# Patient Record
Sex: FEMALE | Race: WHITE | NOT HISPANIC OR LATINO | Employment: FULL TIME | URBAN - METROPOLITAN AREA
[De-identification: names, ages, dates, MRNs, and addresses within clinical notes are randomized per-mention and may not be internally consistent; named-entity substitution may affect disease eponyms.]

---

## 2019-01-25 ENCOUNTER — HOSPITAL ENCOUNTER (EMERGENCY)
Facility: HOSPITAL | Age: 49
Discharge: HOME | End: 2019-01-25
Attending: EMERGENCY MEDICINE | Admitting: EMERGENCY MEDICINE

## 2019-01-25 VITALS
DIASTOLIC BLOOD PRESSURE: 109 MMHG | HEIGHT: 66 IN | RESPIRATION RATE: 16 BRPM | TEMPERATURE: 97.9 F | SYSTOLIC BLOOD PRESSURE: 152 MMHG | HEART RATE: 82 BPM | OXYGEN SATURATION: 100 %

## 2019-01-25 DIAGNOSIS — M54.12 CERVICAL RADICULOPATHY: Primary | ICD-10-CM

## 2019-01-25 PROCEDURE — 99283 EMERGENCY DEPT VISIT LOW MDM: CPT

## 2019-01-25 RX ORDER — METHYLPREDNISOLONE 4 MG/1
TABLET ORAL
Qty: 1 EACH | Refills: 0 | OUTPATIENT
Start: 2019-01-25 | End: 2020-01-20

## 2019-01-25 RX ORDER — METAXALONE 800 MG/1
800 TABLET ORAL 3 TIMES DAILY PRN
Qty: 30 TABLET | Refills: 0 | Status: SHIPPED | OUTPATIENT
Start: 2019-01-25 | End: 2022-12-05

## 2019-01-25 RX ORDER — TRAMADOL HYDROCHLORIDE 50 MG/1
50 TABLET ORAL EVERY 6 HOURS PRN
Qty: 20 TABLET | Refills: 0 | OUTPATIENT
Start: 2019-01-25 | End: 2020-01-20

## 2019-01-25 NOTE — ED NOTES
"+neck pain to right side of neck for about a month. Has noticed two \"knots\" to mid back of neck and right side of neck that pt reports have gotten bigger and become more painful.     Pt is a dental assistant.    Was seen at urgent care earlier this month and was told she may have to have an MRI         Nitza Vanessa, RN  01/25/19 5842    "

## 2019-01-25 NOTE — ED PROVIDER NOTES
" EMERGENCY DEPARTMENT ENCOUNTER    CHIEF COMPLAINT  Chief Complaint: neck pain  History given by: patient  History limited by: none  Room Number: 11/11  PMD: Provider, No Known      HPI:  Pt is a 48 y.o. female who presents complaining of generalized neck pain that began a couple months ago that is worse with turning her head. 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 Pt states she was seen to [facility] at that time and prescribed baclofen. Pt states she has been taking Advil \"religiously.\" Pt states pain radiates down her BUE. Pt states \"its weird and that I have pressure.\" Pt denies dropping items, tingling, and numbness. Pt states she is not followed by a PCP at this time due to recent move.         Duration:  Couple of months  Onset: gradual  Timing: constant  Location: neck  Radiation: BUE  Quality: pain, \"visible knot\"   Intensity/Severity: moderate  Progression: more localized   Aggravating Factors: movement  Treatment before arrival: Pt has been taking Advil for pain relief.     PAST MEDICAL HISTORY  Active Ambulatory Problems     Diagnosis Date Noted   • No Active Ambulatory Problems     Resolved Ambulatory Problems     Diagnosis Date Noted   • No Resolved Ambulatory Problems     Past Medical History:   Diagnosis Date   • Fibromyalgia        PAST SURGICAL HISTORY  Past Surgical History:   Procedure Laterality Date   • ANKLE SURGERY Left 2009       FAMILY HISTORY  History reviewed. No pertinent family history.    SOCIAL HISTORY  Social History     Socioeconomic History   • Marital status:      Spouse name: Not on file   • Number of children: Not on file   • Years of education: Not on file   • Highest education level: Not on file   Social Needs   • Financial resource strain: Not on file   • Food insecurity - worry: Not on file   • Food insecurity - inability: Not on file   • " Transportation needs - medical: Not on file   • Transportation needs - non-medical: Not on file   Occupational History   • Not on file   Tobacco Use   • Smoking status: Never Smoker   • Smokeless tobacco: Never Used   Substance and Sexual Activity   • Alcohol use: Not on file   • Drug use: Not on file   • Sexual activity: Not on file   Other Topics Concern   • Not on file   Social History Narrative   • Not on file       ALLERGIES  Codeine    REVIEW OF SYSTEMS  Review of Systems   Constitutional: Negative for fever.   HENT: Negative for sore throat.    Eyes: Negative.    Respiratory: Negative for cough and shortness of breath.    Cardiovascular: Negative for chest pain.   Gastrointestinal: Negative for abdominal pain, diarrhea and vomiting.   Genitourinary: Negative for dysuria.   Musculoskeletal: Positive for neck pain (Radiates to BUE). Negative for back pain.   Skin: Negative for rash.   Neurological: Negative for weakness, numbness and headaches.   Hematological: Negative.    Psychiatric/Behavioral: Negative.    All other systems reviewed and are negative.      PHYSICAL EXAM  ED Triage Vitals [01/25/19 1448]   Temp Heart Rate Resp BP SpO2   97.9 °F (36.6 °C) 90 18 -- 100 %      Temp src Heart Rate Source Patient Position BP Location FiO2 (%)   Tympanic -- -- -- --       Physical Exam   Constitutional: She is oriented to person, place, and time. No distress.   HENT:   Head: Normocephalic and atraumatic.   Eyes: EOM are normal. Pupils are equal, round, and reactive to light.   Neck: Normal range of motion. Neck supple.   Right paracervical tenderness with a palpable muscle spasm    Abdominal: Soft. There is no tenderness. There is no rebound and no guarding.   Musculoskeletal: Normal range of motion. She exhibits no edema.   Neurological: She is alert and oriented to person, place, and time. She has normal sensation, normal strength, normal reflexes and intact cranial nerves. She displays normal reflexes.  Coordination normal.   Pt has normal ROM and sensation in extremities x4 (Wrist, hand, shoulder abduction and shrug, DTR normal). Pt is NVI.        Skin: Skin is warm and dry. No rash noted.   Psychiatric: Mood and affect normal.   Nursing note and vitals reviewed.        PROGRESS AND CONSULTS     1750-Pt is resting comfortably. Notified pt that based of her history and exam findings that neck pain is consistent with cervical radiculopathy. Discussed the plan to discharge the pt home with prescriptions for ultram, skelaxin, and medrol pack. I instructed the pt to f/u with [facility] and neurosurgery for f/u. Pt understands and agrees with the plan, all questions answered.        MEDICAL DECISION MAKING  Results were reviewed/discussed with the patient and they were also made aware of online access. Pt also made aware that some labs, such as cultures, will not be resulted during ER visit and follow up with PMD is necessary.     MDM       DIAGNOSIS  Final diagnoses:   Cervical radiculopathy       DISPOSITION  DISCHARGE    Patient discharged in stable condition.    Reviewed implications of results, diagnosis, meds, responsibility to follow up, warning signs and symptoms of possible worsening, potential complications and reasons to return to ER.    Patient/Family voiced understanding of above instructions.    Discussed plan for discharge, as there is no emergent indication for admission. Patient referred to primary care provider for BP management due to today's BP. Pt/family is agreeable and understands need for follow up and repeat testing.  Pt is aware that discharge does not mean that nothing is wrong but it indicates no emergency is present that requires admission and they must continue care with follow-up as given below or physician of their choice.     FOLLOW-UP  PATIENT LIAISON [facility] [id number] [phone]  Schedule an appointment as soon as possible for a visit       Hitesh  MD Maciel  [address]  [facility] [address]  [facility] [address]  [phone]    Call       [facility] Emergency Department  [address]  [facility] [address] [phone]    As needed, If symptoms worsen         Medication List      New Prescriptions    metaxalone 800 MG tablet  Commonly known as:  SKELAXIN  Take 1 tablet by mouth 3 (Three) Times a Day As Needed for Muscle Spasms.     MethylPREDNISolone 4 MG tablet  Commonly known as:  MEDROL (JANAY)  Take as directed on package instructions.        Changed    traMADol 50 MG tablet  Commonly known as:  ULTRAM  Take 1 tablet by mouth Every 6 (Six) Hours As Needed for Moderate Pain .  What changed:    how much to take  how to take this  when to take this  reasons to take this        Stop    baclofen 10 MG tablet  Commonly known as:  LIORESAL              Latest Documented Vital Signs:  As of 6:06 PM  BP- (!) 152/109 HR- 90 Temp- 97.9 °F (36.6 °C) (Tympanic) O2 sat- 100%    --  Documentation assistance provided by austin Encarnacion for MD Blair.  Information recorded by the scribe was done at my direction and has been verified and validated by me.     Lisseth Encarnacion  01/25/19 4513       Santino Hernandez MD  01/25/19 8163

## 2020-01-20 ENCOUNTER — APPOINTMENT (OUTPATIENT)
Dept: ULTRASOUND IMAGING | Facility: HOSPITAL | Age: 50
End: 2020-01-20

## 2020-01-20 ENCOUNTER — HOSPITAL ENCOUNTER (EMERGENCY)
Facility: HOSPITAL | Age: 50
Discharge: HOME | End: 2020-01-20
Admitting: EMERGENCY MEDICINE

## 2020-01-20 VITALS
BODY MASS INDEX: 30.33 KG/M2 | TEMPERATURE: 97.9 F | DIASTOLIC BLOOD PRESSURE: 87 MMHG | HEART RATE: 82 BPM | HEIGHT: 66 IN | SYSTOLIC BLOOD PRESSURE: 143 MMHG | WEIGHT: 188.71 LBS | OXYGEN SATURATION: 100 % | RESPIRATION RATE: 18 BRPM

## 2020-01-20 DIAGNOSIS — N93.9 ABNORMAL UTERINE BLEEDING: Primary | ICD-10-CM

## 2020-01-20 DIAGNOSIS — E87.6 HYPOKALEMIA: ICD-10-CM

## 2020-01-20 DIAGNOSIS — R10.30 LOWER ABDOMINAL PAIN: ICD-10-CM

## 2020-01-20 LAB
ABO GROUP BLD: NORMAL
ANION GAP SERPL CALCULATED.3IONS-SCNC: 11 MMOL/L (ref 5–15)
APTT PPP: 22.4 SECONDS (ref 24–31)
BASOPHILS # BLD AUTO: 0.2 10*3/MM3 (ref 0–0.2)
BASOPHILS NFR BLD AUTO: 1.2 % (ref 0–1.5)
BLD GP AB SCN SERPL QL: NEGATIVE
BUN BLD-MCNC: 12 MG/DL (ref 6–20)
BUN/CREAT SERPL: 15.6 (ref 7–25)
CALCIUM SPEC-SCNC: 9.2 MG/DL (ref 8.6–10.5)
CHLORIDE SERPL-SCNC: 102 MMOL/L (ref 98–107)
CO2 SERPL-SCNC: 25 MMOL/L (ref 22–29)
CREAT BLD-MCNC: 0.77 MG/DL (ref 0.57–1)
DEPRECATED RDW RBC AUTO: 43.3 FL (ref 37–54)
EOSINOPHIL # BLD AUTO: 0.2 10*3/MM3 (ref 0–0.4)
EOSINOPHIL NFR BLD AUTO: 1.2 % (ref 0.3–6.2)
ERYTHROCYTE [DISTWIDTH] IN BLOOD BY AUTOMATED COUNT: 14.3 % (ref 12.3–15.4)
GFR SERPL CREATININE-BSD FRML MDRD: 79 ML/MIN/1.73
GLUCOSE BLD-MCNC: 99 MG/DL (ref 65–99)
HCT VFR BLD AUTO: 42.6 % (ref 34–46.6)
HGB BLD-MCNC: 14.3 G/DL (ref 12–15.9)
HOLD SPECIMEN: NORMAL
INR PPP: 0.98 (ref 0.9–1.1)
LYMPHOCYTES # BLD AUTO: 2.6 10*3/MM3 (ref 0.7–3.1)
LYMPHOCYTES NFR BLD AUTO: 18.2 % (ref 19.6–45.3)
MCH RBC QN AUTO: 29 PG (ref 26.6–33)
MCHC RBC AUTO-ENTMCNC: 33.5 G/DL (ref 31.5–35.7)
MCV RBC AUTO: 86.7 FL (ref 79–97)
MONOCYTES # BLD AUTO: 0.9 10*3/MM3 (ref 0.1–0.9)
MONOCYTES NFR BLD AUTO: 6 % (ref 5–12)
NEUTROPHILS # BLD AUTO: 10.7 10*3/MM3 (ref 1.7–7)
NEUTROPHILS NFR BLD AUTO: 73.4 % (ref 42.7–76)
NRBC BLD AUTO-RTO: 0 /100 WBC (ref 0–0.2)
PLATELET # BLD AUTO: 334 10*3/MM3 (ref 140–450)
PMV BLD AUTO: 8.6 FL (ref 6–12)
POTASSIUM BLD-SCNC: 3.2 MMOL/L (ref 3.5–5.2)
PROTHROMBIN TIME: 10.4 SECONDS (ref 9.6–11.7)
RBC # BLD AUTO: 4.92 10*6/MM3 (ref 3.77–5.28)
RH BLD: POSITIVE
SODIUM BLD-SCNC: 138 MMOL/L (ref 136–145)
T&S EXPIRATION DATE: NORMAL
WBC NRBC COR # BLD: 14.5 10*3/MM3 (ref 3.4–10.8)

## 2020-01-20 PROCEDURE — 86850 RBC ANTIBODY SCREEN: CPT | Performed by: NURSE PRACTITIONER

## 2020-01-20 PROCEDURE — 96374 THER/PROPH/DIAG INJ IV PUSH: CPT

## 2020-01-20 PROCEDURE — 86901 BLOOD TYPING SEROLOGIC RH(D): CPT

## 2020-01-20 PROCEDURE — 25010000002 ONDANSETRON PER 1 MG: Performed by: NURSE PRACTITIONER

## 2020-01-20 PROCEDURE — 85610 PROTHROMBIN TIME: CPT | Performed by: NURSE PRACTITIONER

## 2020-01-20 PROCEDURE — 86901 BLOOD TYPING SEROLOGIC RH(D): CPT | Performed by: NURSE PRACTITIONER

## 2020-01-20 PROCEDURE — 86900 BLOOD TYPING SEROLOGIC ABO: CPT | Performed by: NURSE PRACTITIONER

## 2020-01-20 PROCEDURE — 76856 US EXAM PELVIC COMPLETE: CPT

## 2020-01-20 PROCEDURE — 25010000002 KETOROLAC TROMETHAMINE PER 15 MG: Performed by: NURSE PRACTITIONER

## 2020-01-20 PROCEDURE — 99284 EMERGENCY DEPT VISIT MOD MDM: CPT

## 2020-01-20 PROCEDURE — 80048 BASIC METABOLIC PNL TOTAL CA: CPT | Performed by: NURSE PRACTITIONER

## 2020-01-20 PROCEDURE — 76830 TRANSVAGINAL US NON-OB: CPT

## 2020-01-20 PROCEDURE — 96361 HYDRATE IV INFUSION ADD-ON: CPT

## 2020-01-20 PROCEDURE — 96375 TX/PRO/DX INJ NEW DRUG ADDON: CPT

## 2020-01-20 PROCEDURE — 85025 COMPLETE CBC W/AUTO DIFF WBC: CPT | Performed by: NURSE PRACTITIONER

## 2020-01-20 PROCEDURE — 86900 BLOOD TYPING SEROLOGIC ABO: CPT

## 2020-01-20 PROCEDURE — 85730 THROMBOPLASTIN TIME PARTIAL: CPT | Performed by: NURSE PRACTITIONER

## 2020-01-20 RX ORDER — KETOROLAC TROMETHAMINE 15 MG/ML
15 INJECTION, SOLUTION INTRAMUSCULAR; INTRAVENOUS ONCE
Status: COMPLETED | OUTPATIENT
Start: 2020-01-20 | End: 2020-01-20

## 2020-01-20 RX ORDER — ONDANSETRON 2 MG/ML
4 INJECTION INTRAMUSCULAR; INTRAVENOUS ONCE
Status: COMPLETED | OUTPATIENT
Start: 2020-01-20 | End: 2020-01-20

## 2020-01-20 RX ORDER — TRAMADOL HYDROCHLORIDE 50 MG/1
50 TABLET ORAL EVERY 6 HOURS PRN
Qty: 12 TABLET | Refills: 0 | Status: SHIPPED | OUTPATIENT
Start: 2020-01-20 | End: 2022-12-05

## 2020-01-20 RX ORDER — POTASSIUM CHLORIDE 20 MEQ/1
40 TABLET, EXTENDED RELEASE ORAL ONCE
Status: COMPLETED | OUTPATIENT
Start: 2020-01-20 | End: 2020-01-20

## 2020-01-20 RX ORDER — ONDANSETRON 4 MG/1
4 TABLET, ORALLY DISINTEGRATING ORAL EVERY 8 HOURS PRN
Qty: 20 TABLET | Refills: 0 | Status: SHIPPED | OUTPATIENT
Start: 2020-01-20 | End: 2022-12-05

## 2020-01-20 RX ORDER — IBUPROFEN 800 MG/1
800 TABLET ORAL EVERY 8 HOURS PRN
Qty: 9 TABLET | Refills: 0 | Status: SHIPPED | OUTPATIENT
Start: 2020-01-20 | End: 2020-01-20 | Stop reason: SDUPTHER

## 2020-01-20 RX ORDER — TRANEXAMIC ACID 650 MG/1
1300 TABLET ORAL 3 TIMES DAILY
Qty: 30 TABLET | Refills: 0 | Status: SHIPPED | OUTPATIENT
Start: 2020-01-20 | End: 2020-01-25

## 2020-01-20 RX ORDER — SODIUM CHLORIDE 0.9 % (FLUSH) 0.9 %
10 SYRINGE (ML) INJECTION AS NEEDED
Status: DISCONTINUED | OUTPATIENT
Start: 2020-01-20 | End: 2020-01-21 | Stop reason: HOSPADM

## 2020-01-20 RX ADMIN — POTASSIUM CHLORIDE 40 MEQ: 1500 TABLET, EXTENDED RELEASE ORAL at 21:52

## 2020-01-20 RX ADMIN — SODIUM CHLORIDE 1000 ML: 900 INJECTION, SOLUTION INTRAVENOUS at 21:00

## 2020-01-20 RX ADMIN — KETOROLAC TROMETHAMINE 15 MG: 15 INJECTION, SOLUTION INTRAMUSCULAR; INTRAVENOUS at 21:01

## 2020-01-20 RX ADMIN — ONDANSETRON 4 MG: 2 INJECTION INTRAMUSCULAR; INTRAVENOUS at 21:01

## 2020-01-21 NOTE — DISCHARGE INSTRUCTIONS
Follow-up with gynecology or your primary care doctor.  Medications as directed.  Return for any new or worsening symptoms.  Plenty of fluids.

## 2020-01-21 NOTE — ED PROVIDER NOTES
"Subjective   Chief complaint: vaginal bleeding      Context: patient is a 49 yof with c/o vaginal bleeding. States she had spotting from 7 to 4 days ago, then started heavily bleeding from 4 days ago until today, and started going through a tampon +pad an hour since 10 am. States she called her Dr office but was not able to be seen today. She had her last period in \"fall of 2018.\" she has had some lower abd cramping. She states her face started breaking out last week which it only did when she was on her periods. She denies any urinary complaints, vaginal discharge, new partners, fever, vomiting or diarrhea. Has had some abd cramping which has made her nauseated. Has a hx of fibrocystic breast disease but denies hx of ovarian cysts, endometriosis, abnormal paps. States she had a normal pap in July 2019. She had an abnormal mammogram in 7/2019 but states when she went for her breast biopsy \"the radiologist told her she wasn't going to do them because one cyst was gone and the other was mostly resolved.\" she has no hx of cancer. No prior colonoscopy. Denies any vaginal trauma. She denies any dyspnea, cp, dizziness, syncope, light headedness.    Duration: 1 week    Timing: waxes and wanes    Severity: moderate    Associated symptoms: no relief with excedrin today          PCP:      LNMP: fall 2018            Review of Systems   Constitutional: Negative.    HENT: Negative.    Eyes: Negative.    Respiratory: Negative.    Cardiovascular: Negative.    Gastrointestinal: Positive for abdominal pain and nausea.   Genitourinary: Positive for vaginal bleeding.   Musculoskeletal: Negative.    Skin: Negative.    Allergic/Immunologic: Negative for immunocompromised state.   Neurological: Negative.    Hematological: Does not bruise/bleed easily.       Past Medical History:   Diagnosis Date   • Fibromyalgia        Allergies   Allergen Reactions   • Codeine Hives       Past Surgical History:   Procedure Laterality Date   • ANKLE " SURGERY Left 2009       No family history on file.    Social History     Socioeconomic History   • Marital status:      Spouse name: Not on file   • Number of children: Not on file   • Years of education: Not on file   • Highest education level: Not on file   Tobacco Use   • Smoking status: Never Smoker   • Smokeless tobacco: Never Used           Objective   Physical Exam     Vital signs and triage nurse note reviewed.   Constitutional: Awake, alert; well-developed and well-nourished. Mildly anxious  HEENT: Normocephalic, atraumatic; pupils are PERRL with intact EOM; oropharynx is pink and moist without exudate or erythema.   Neck: Supple, full range of motion without pain; no cervical lymphadenopathy.   Cardiovascular: Regular rate and rhythm, normal S1-S2.   Pulmonary: Respiratory effort regular nonlabored, breath sounds clear to auscultation all fields.   Abdomen: Soft, mildly tender suprapubic nondistended with normoactive bowel sounds; no rebound or guarding. - nolan/mcburny.  Musculoskeletal: Independent range of motion of all extremities with no palpable tenderness or edema.   Neuro: Alert oriented x3, speech is clear and appropriate, GCS 15   Skin:  Fleshtone warm, dry, intact; no erythematous or petechial rash or lesion       The patient was placed in lithotomy position.  External genitalia were normal except a minor tear to the inferior posterior introitus.  There was no evidence of rash external lesions or abscesses.  Speculum was inserted.  Posterior vaginal vault was examined.    There was blood in the posterior vaginal vault.    Blood noted from cervical os without lesions/tears/abrasions.    Bimanual exam was performed.  Uterus was nonenlarged and nontender there was no adnexal enlargement or tenderness.       Procedures           ED Course      Labs Reviewed   BASIC METABOLIC PANEL - Abnormal; Notable for the following components:       Result Value    Potassium 3.2 (*)     All other  components within normal limits    Narrative:     GFR Normal >60  Chronic Kidney Disease <60  Kidney Failure <15     APTT - Abnormal; Notable for the following components:    PTT 22.4 (*)     All other components within normal limits   CBC WITH AUTO DIFFERENTIAL - Abnormal; Notable for the following components:    WBC 14.50 (*)     Lymphocyte % 18.2 (*)     Neutrophils, Absolute 10.70 (*)     All other components within normal limits   PROTIME-INR - Normal   TYPE AND SCREEN   BB ARMBAND CHECK   CBC AND DIFFERENTIAL    Narrative:     The following orders were created for panel order CBC & Differential.  Procedure                               Abnormality         Status                     ---------                               -----------         ------                     CBC Auto Differential[[id number]]        Abnormal            Final result                 Please view results for these tests on the individual orders.   EXTRA TUBES    Narrative:     The following orders were created for panel order Extra Tubes.  Procedure                               Abnormality         Status                     ---------                               -----------         ------                     Gold Top - SST[[id number]]                                   Final result                 Please view results for these tests on the individual orders.   GOLD TOP - SST     Medications   sodium chloride 0.9 % flush 10 mL (has no administration in time range)   sodium chloride 0.9 % bolus 1,000 mL (1,000 mL Intravenous New Bag 1/20/20 2100)   ondansetron (ZOFRAN) injection 4 mg (4 mg Intravenous Given 1/20/20 2101)   ketorolac (TORADOL) injection 15 mg (15 mg Intravenous Given 1/20/20 2101)     Us Non-ob Transvaginal    Result Date: 1/20/2020   1. Echogenic endometrial stripe is smooth and linear and homogeneous and measures 6.3 mm in the fundus the uterus. 2. No evidence of uterine mass or fibroid. 3. The right and left ovaries are not  visualized on transabdominal or transvaginal pelvic ultrasound imaging. 4. No evidence of fluid in the pelvis cul-de-sac  Electronically Signed By-DR. Juan M Estrada MD On:1/20/2020 9:32 PM This report was finalized on [id number] by DR. Juan M Estrada MD.    Us Pelvis Complete    Result Date: 1/20/2020   1. Echogenic endometrial stripe is smooth and linear and homogeneous and measures 6.3 mm in the fundus the uterus. 2. No evidence of uterine mass or fibroid. 3. The right and left ovaries are not visualized on transabdominal or transvaginal pelvic ultrasound imaging. 4. No evidence of fluid in the pelvis cul-de-sac  Electronically Signed By-DR. Juan M Estrada MD On:1/20/2020 9:32 PM This report was finalized on [id number] by DR. Juan M Estrada MD.                                             MDM  Number of Diagnoses or Management Options  Abnormal uterine bleeding:   Hypokalemia:   Lower abdominal pain:   Diagnosis management comments: Chart review:7/3/19: mammogram: IMPRESSION:    Finding 1:  Focal asymmetry in the inferior aspect of the right breast at posterior depth requires additional evaluation. Recommend additional mammographic imaging and possible breast ultrasound.    Finding 2:  Focal asymmetry in the lateral aspect of the right breast at posterior depth requires additional evaluation. Recommend additional mammographic imaging and possible breast ultrasound.    BI-RADS Category 0:    Incomplete:  Need Additional Imaging Evaluation        Medical decision  Comorbidities:  has a past medical history of Fibromyalgia.  Differentials: Anemia electrolytes abnormalities mass cancer hormone abnormalities  Radiology interpretation:  X-rays reviewed by me and interpreted by radiologist,   Us Non-ob Transvaginal    Result Date: 1/20/2020   1. Echogenic endometrial stripe is smooth and linear and homogeneous and measures 6.3 mm in the fundus the uterus. 2. No evidence of uterine mass or  fibroid. 3. The right and left ovaries are not visualized on transabdominal or transvaginal pelvic ultrasound imaging. 4. No evidence of fluid in the pelvis cul-de-sac  Electronically Signed By-DR. Juan M Estrada MD On:1/20/2020 9:32 PM This report was finalized on [id number] by DR. Juan M Estrada MD.    Us Pelvis Complete    Result Date: 1/20/2020   1. Echogenic endometrial stripe is smooth and linear and homogeneous and measures 6.3 mm in the fundus the uterus. 2. No evidence of uterine mass or fibroid. 3. The right and left ovaries are not visualized on transabdominal or transvaginal pelvic ultrasound imaging. 4. No evidence of fluid in the pelvis cul-de-sac  Electronically Signed By-DR. Juan M Estrada MD On:1/20/2020 9:32 PM This report was finalized on [id number] by DR. Juan M Estrada MD.    Lab interpretation:  Labs viewed by me significant for, potassium 3.2    i discussed findings with patient who voices understanding of discharge instructions, signs and symptoms requiring return to ED; discharged improved and in stable condition with follow up for re-evaluation.  She will be discharged on TXA and Zofran  Patient was given dose of potassium  We discussed the importance of follow-up as patient may need endometrial biopsy and further evaluation    Inspect report: Patient had hydrocodone filled on 10/23/2019  Be discharged home with tramadol.  States she is Dieudonne taking Motrin 800s.      Final diagnoses:   Abnormal uterine bleeding   Lower abdominal pain            Kim Chase, APRN  01/20/20 1983

## 2020-03-02 ENCOUNTER — APPOINTMENT (OUTPATIENT)
Dept: GENERAL RADIOLOGY | Facility: HOSPITAL | Age: 50
End: 2020-03-02

## 2020-03-02 ENCOUNTER — HOSPITAL ENCOUNTER (EMERGENCY)
Facility: HOSPITAL | Age: 50
Discharge: HOME | End: 2020-03-02
Attending: EMERGENCY MEDICINE | Admitting: EMERGENCY MEDICINE

## 2020-03-02 VITALS
DIASTOLIC BLOOD PRESSURE: 99 MMHG | HEIGHT: 66 IN | RESPIRATION RATE: 20 BRPM | OXYGEN SATURATION: 98 % | BODY MASS INDEX: 29.44 KG/M2 | WEIGHT: 183.2 LBS | HEART RATE: 97 BPM | TEMPERATURE: 98.5 F | SYSTOLIC BLOOD PRESSURE: 117 MMHG

## 2020-03-02 DIAGNOSIS — J18.9 PNEUMONIA OF LEFT LUNG DUE TO INFECTIOUS ORGANISM, UNSPECIFIED PART OF LUNG: ICD-10-CM

## 2020-03-02 DIAGNOSIS — R06.00 DYSPNEA, UNSPECIFIED TYPE: Primary | ICD-10-CM

## 2020-03-02 DIAGNOSIS — J10.1 INFLUENZA B: ICD-10-CM

## 2020-03-02 LAB
FLUAV SUBTYP SPEC NAA+PROBE: NOT DETECTED
FLUBV RNA ISLT QL NAA+PROBE: DETECTED

## 2020-03-02 PROCEDURE — 93005 ELECTROCARDIOGRAM TRACING: CPT | Performed by: EMERGENCY MEDICINE

## 2020-03-02 PROCEDURE — 71046 X-RAY EXAM CHEST 2 VIEWS: CPT

## 2020-03-02 PROCEDURE — 94640 AIRWAY INHALATION TREATMENT: CPT

## 2020-03-02 PROCEDURE — 94799 UNLISTED PULMONARY SVC/PX: CPT

## 2020-03-02 PROCEDURE — 63710000001 PREDNISONE PER 1 MG: Performed by: EMERGENCY MEDICINE

## 2020-03-02 PROCEDURE — 87502 INFLUENZA DNA AMP PROBE: CPT | Performed by: EMERGENCY MEDICINE

## 2020-03-02 PROCEDURE — 99284 EMERGENCY DEPT VISIT MOD MDM: CPT

## 2020-03-02 RX ORDER — DOXYCYCLINE 100 MG/1
100 CAPSULE ORAL 2 TIMES DAILY
Qty: 14 CAPSULE | Refills: 0 | Status: SHIPPED | OUTPATIENT
Start: 2020-03-02 | End: 2022-12-05

## 2020-03-02 RX ORDER — PREDNISONE 20 MG/1
60 TABLET ORAL ONCE
Status: COMPLETED | OUTPATIENT
Start: 2020-03-02 | End: 2020-03-02

## 2020-03-02 RX ORDER — ALBUTEROL SULFATE 2.5 MG/3ML
2.5 SOLUTION RESPIRATORY (INHALATION) ONCE
Status: COMPLETED | OUTPATIENT
Start: 2020-03-02 | End: 2020-03-02

## 2020-03-02 RX ADMIN — ALBUTEROL SULFATE 2.5 MG: 2.5 SOLUTION RESPIRATORY (INHALATION) at 09:30

## 2020-03-02 RX ADMIN — PREDNISONE 60 MG: 20 TABLET ORAL at 09:48

## 2020-03-02 NOTE — ED NOTES
Called respiratory about breathing treatment orders.RT called     Nadine Valdivia  03/02/20 9759

## 2020-03-02 NOTE — ED PROVIDER NOTES
Subjective   History of Present Illness  Cough, shortness of breath  49-year-old female complains of cough and shortness of breath over the last 1 week.  She states been coughing up phlegm.  She was seen by her primary care physician about 1 week ago and started on albuterol and cough suppressant.  She states she tried to go to work today and was still feeling short of breath and coughing.  She states she had nasal congestion and a low-grade fever.  She states she is around the public at work and otherwise reports no known ill contacts and denies recent travel.  Review of Systems   Constitutional: Positive for fever.   HENT: Positive for congestion.    Eyes: Negative.    Respiratory: Positive for cough, shortness of breath and wheezing.    Cardiovascular: Negative.    Gastrointestinal: Negative.    Genitourinary: Negative.    Musculoskeletal: Negative.    Skin: Negative.    Neurological: Negative.        Past Medical History:   Diagnosis Date   • Fibromyalgia        Allergies   Allergen Reactions   • Codeine Hives       Past Surgical History:   Procedure Laterality Date   • ANKLE SURGERY Left 2009       No family history on file.    Social History     Socioeconomic History   • Marital status:      Spouse name: Not on file   • Number of children: Not on file   • Years of education: Not on file   • Highest education level: Not on file   Tobacco Use   • Smoking status: Never Smoker   • Smokeless tobacco: Never Used       Prior to Admission medications    Medication Sig Start Date End Date Taking? Authorizing Provider   metaxalone (SKELAXIN) 800 MG tablet Take 1 tablet by mouth 3 (Three) Times a Day As Needed for Muscle Spasms. 1/25/19   Santino Hernandez MD   ondansetron ODT (ZOFRAN-ODT) 4 MG disintegrating tablet Take 1 tablet by mouth Every 8 (Eight) Hours As Needed for Nausea or Vomiting. 1/20/20   Kim Chase APRN   traMADol (ULTRAM) 50 MG tablet Take 1 tablet by mouth Every 6 (Six) Hours As Needed  "for Moderate Pain . 1/20/20   Kim Chase, APRN     /84   Pulse 90   Temp 97.3 °F (36.3 °C) (Oral)   Resp 20   Ht 167.6 cm (66\")   Wt 83.1 kg (183 lb 3.2 oz)   SpO2 99%   BMI 29.57 kg/m²       Objective   Physical Exam  General: Well-developed well-appearing, no acute distress, alert and appropriate  Eyes: Pupils round, sclera nonicteric  HEENT: Mucous membranes moist, no mucosal swelling  Neck: Supple, no nuchal rigidity, no lymphadenopathy  Respirations: Expiratory wheeze, occasional bronchospastic cough, respirations nonlabored, oxygen saturations 100%  Heart regular rate and rhythm, no murmurs rubs or gallops,   Abdomen soft nontender nondistended,   Extremities no clubbing cyanosis or edema, calves are symmetric and nontender  Neuro cranial nerves grossly intact, no focal limb deficits  Psych oriented, pleasant affect  Skin no rash, brisk cap refill  Procedures           ED Course    My EKG interpretation, normal sinus rhythm, no acute ST or T wave abnormalities           Xr Chest 2 View    Result Date: 3/2/2020  Suspected mild ill-defined infiltrate within the left upper lobe.  Electronically Signed By-Dr. Lynn Mesa MD On:3/2/2020 9:53 AM This report was finalized on [id number] by Dr. Lynn Mesa MD.    Results for orders placed or performed during the hospital encounter of 03/02/20   Influenza Antigen, Rapid - Swab, Nasopharynx   Result Value Ref Range    Influenza A PCR Not Detected Not Detected    Influenza B PCR Detected (A) Not Detected                                  MDM  Patient is oxygen saturations of 100%.  She is in no respiratory distress she had some slight wheezing.  The chest x-ray finding is very subtle.  She is afebrile.  With the possibility of an early infiltrate she was prescribed doxycycline for possible early pneumonia.  She will continue her inhaler.  She is discharged in good condition and given warning signs for return.  I do not feel that she would " benefit from Tamiflu at this point a week after symptom onset.  Final diagnoses:   Dyspnea, unspecified type   Influenza B   Pneumonia of left lung due to infectious organism, unspecified part of lung            Jeferson Kiser MD  03/02/20 103

## 2020-03-02 NOTE — DISCHARGE INSTRUCTIONS
Return for increased pain fever vomiting, shortness of air or any other concerns. See your doctor this week for follow up.  Rest, drink plenty of fluids   scds, hsq scds, heparin SQ

## 2020-03-02 NOTE — ED NOTES
Pt reports she went to work today and her work told them that anyone who was sick with flu symptoms should go home. So she drove here from work to be checked. Reports intermittent cough and SOB.      America Bacon, RN  03/02/20 0921

## 2022-12-05 ENCOUNTER — HOSPITAL ENCOUNTER (OUTPATIENT)
Facility: HOSPITAL | Age: 52
Setting detail: OBSERVATION
Discharge: LEFT BEFORE DISCHARGE | End: 2022-12-05
Attending: EMERGENCY MEDICINE | Admitting: EMERGENCY MEDICINE

## 2022-12-05 ENCOUNTER — APPOINTMENT (OUTPATIENT)
Dept: GENERAL RADIOLOGY | Facility: HOSPITAL | Age: 52
End: 2022-12-05

## 2022-12-05 VITALS
RESPIRATION RATE: 18 BRPM | BODY MASS INDEX: 29.41 KG/M2 | WEIGHT: 183 LBS | TEMPERATURE: 98.5 F | OXYGEN SATURATION: 99 % | DIASTOLIC BLOOD PRESSURE: 98 MMHG | SYSTOLIC BLOOD PRESSURE: 168 MMHG | HEIGHT: 66 IN | HEART RATE: 85 BPM

## 2022-12-05 DIAGNOSIS — Z82.49 FAMILY HISTORY OF PREMATURE CAD: ICD-10-CM

## 2022-12-05 DIAGNOSIS — R03.0 TRANSIENT ELEVATED BLOOD PRESSURE: ICD-10-CM

## 2022-12-05 DIAGNOSIS — Z72.0 EPISODIC TOBACCO ABUSE: ICD-10-CM

## 2022-12-05 DIAGNOSIS — R07.9 CHEST PAIN, UNSPECIFIED TYPE: Primary | ICD-10-CM

## 2022-12-05 LAB
ALBUMIN SERPL-MCNC: 4.7 G/DL (ref 3.5–5.2)
ALBUMIN/GLOB SERPL: 1.7 G/DL
ALP SERPL-CCNC: 88 U/L (ref 39–117)
ALT SERPL W P-5'-P-CCNC: 20 U/L (ref 1–33)
ANION GAP SERPL CALCULATED.3IONS-SCNC: 11 MMOL/L (ref 5–15)
APTT PPP: 24.7 SECONDS (ref 24–31)
AST SERPL-CCNC: 19 U/L (ref 1–32)
BASOPHILS # BLD AUTO: 0.1 10*3/MM3 (ref 0–0.2)
BASOPHILS NFR BLD AUTO: 0.9 % (ref 0–1.5)
BILIRUB SERPL-MCNC: 0.2 MG/DL (ref 0–1.2)
BILIRUB UR QL STRIP: NEGATIVE
BUN SERPL-MCNC: 11 MG/DL (ref 6–20)
BUN/CREAT SERPL: 14.5 (ref 7–25)
CALCIUM SPEC-SCNC: 9.8 MG/DL (ref 8.6–10.5)
CHLORIDE SERPL-SCNC: 101 MMOL/L (ref 98–107)
CLARITY UR: CLEAR
CO2 SERPL-SCNC: 26 MMOL/L (ref 22–29)
COLOR UR: YELLOW
CREAT SERPL-MCNC: 0.76 MG/DL (ref 0.57–1)
D DIMER PPP FEU-MCNC: 0.4 MG/L (FEU) (ref 0–0.54)
DEPRECATED RDW RBC AUTO: 41.6 FL (ref 37–54)
EGFRCR SERPLBLD CKD-EPI 2021: 93.3 ML/MIN/1.73
EOSINOPHIL # BLD AUTO: 0.3 10*3/MM3 (ref 0–0.4)
EOSINOPHIL NFR BLD AUTO: 3.3 % (ref 0.3–6.2)
ERYTHROCYTE [DISTWIDTH] IN BLOOD BY AUTOMATED COUNT: 13.1 % (ref 12.3–15.4)
GLOBULIN UR ELPH-MCNC: 2.8 GM/DL
GLUCOSE SERPL-MCNC: 100 MG/DL (ref 65–99)
GLUCOSE UR STRIP-MCNC: NEGATIVE MG/DL
HCT VFR BLD AUTO: 40.9 % (ref 34–46.6)
HGB BLD-MCNC: 13.9 G/DL (ref 12–15.9)
HGB UR QL STRIP.AUTO: NEGATIVE
INR PPP: 0.93 (ref 0.93–1.1)
KETONES UR QL STRIP: NEGATIVE
LEUKOCYTE ESTERASE UR QL STRIP.AUTO: NEGATIVE
LYMPHOCYTES # BLD AUTO: 1.9 10*3/MM3 (ref 0.7–3.1)
LYMPHOCYTES NFR BLD AUTO: 20.7 % (ref 19.6–45.3)
MAGNESIUM SERPL-MCNC: 2.1 MG/DL (ref 1.6–2.6)
MCH RBC QN AUTO: 29.2 PG (ref 26.6–33)
MCHC RBC AUTO-ENTMCNC: 34 G/DL (ref 31.5–35.7)
MCV RBC AUTO: 85.9 FL (ref 79–97)
MONOCYTES # BLD AUTO: 0.5 10*3/MM3 (ref 0.1–0.9)
MONOCYTES NFR BLD AUTO: 5.5 % (ref 5–12)
NEUTROPHILS NFR BLD AUTO: 6.5 10*3/MM3 (ref 1.7–7)
NEUTROPHILS NFR BLD AUTO: 69.6 % (ref 42.7–76)
NITRITE UR QL STRIP: NEGATIVE
NRBC BLD AUTO-RTO: 0 /100 WBC (ref 0–0.2)
NT-PROBNP SERPL-MCNC: 220.2 PG/ML (ref 0–900)
PH UR STRIP.AUTO: 6 [PH] (ref 5–8)
PLATELET # BLD AUTO: 287 10*3/MM3 (ref 140–450)
PMV BLD AUTO: 8.1 FL (ref 6–12)
POTASSIUM SERPL-SCNC: 4.4 MMOL/L (ref 3.5–5.2)
PROT SERPL-MCNC: 7.5 G/DL (ref 6–8.5)
PROT UR QL STRIP: NEGATIVE
PROTHROMBIN TIME: 9.6 SECONDS (ref 9.6–11.7)
RBC # BLD AUTO: 4.76 10*6/MM3 (ref 3.77–5.28)
SODIUM SERPL-SCNC: 138 MMOL/L (ref 136–145)
SP GR UR STRIP: 1.01 (ref 1–1.03)
TROPONIN T SERPL-MCNC: <0.01 NG/ML (ref 0–0.03)
TSH SERPL DL<=0.05 MIU/L-ACNC: 1.29 UIU/ML (ref 0.27–4.2)
UROBILINOGEN UR QL STRIP: NORMAL
WBC NRBC COR # BLD: 9.3 10*3/MM3 (ref 3.4–10.8)

## 2022-12-05 PROCEDURE — 85379 FIBRIN DEGRADATION QUANT: CPT | Performed by: NURSE PRACTITIONER

## 2022-12-05 PROCEDURE — 83880 ASSAY OF NATRIURETIC PEPTIDE: CPT | Performed by: NURSE PRACTITIONER

## 2022-12-05 PROCEDURE — 93005 ELECTROCARDIOGRAM TRACING: CPT | Performed by: EMERGENCY MEDICINE

## 2022-12-05 PROCEDURE — 81003 URINALYSIS AUTO W/O SCOPE: CPT | Performed by: NURSE PRACTITIONER

## 2022-12-05 PROCEDURE — 99285 EMERGENCY DEPT VISIT HI MDM: CPT

## 2022-12-05 PROCEDURE — 71045 X-RAY EXAM CHEST 1 VIEW: CPT

## 2022-12-05 PROCEDURE — 80050 GENERAL HEALTH PANEL: CPT | Performed by: NURSE PRACTITIONER

## 2022-12-05 PROCEDURE — G0378 HOSPITAL OBSERVATION PER HR: HCPCS

## 2022-12-05 PROCEDURE — 25010000002 LORAZEPAM PER 2 MG: Performed by: NURSE PRACTITIONER

## 2022-12-05 PROCEDURE — 85730 THROMBOPLASTIN TIME PARTIAL: CPT | Performed by: NURSE PRACTITIONER

## 2022-12-05 PROCEDURE — 96374 THER/PROPH/DIAG INJ IV PUSH: CPT

## 2022-12-05 PROCEDURE — 96375 TX/PRO/DX INJ NEW DRUG ADDON: CPT

## 2022-12-05 PROCEDURE — 84484 ASSAY OF TROPONIN QUANT: CPT | Performed by: NURSE PRACTITIONER

## 2022-12-05 PROCEDURE — 83735 ASSAY OF MAGNESIUM: CPT | Performed by: NURSE PRACTITIONER

## 2022-12-05 PROCEDURE — 85610 PROTHROMBIN TIME: CPT | Performed by: NURSE PRACTITIONER

## 2022-12-05 RX ORDER — BISACODYL 5 MG/1
5 TABLET, DELAYED RELEASE ORAL DAILY PRN
Status: DISCONTINUED | OUTPATIENT
Start: 2022-12-05 | End: 2022-12-06 | Stop reason: HOSPADM

## 2022-12-05 RX ORDER — CLONIDINE HYDROCHLORIDE 0.1 MG/1
0.1 TABLET ORAL ONCE
Status: COMPLETED | OUTPATIENT
Start: 2022-12-05 | End: 2022-12-05

## 2022-12-05 RX ORDER — CLONIDINE HYDROCHLORIDE 0.1 MG/1
0.1 TABLET ORAL EVERY 12 HOURS SCHEDULED
Status: DISCONTINUED | OUTPATIENT
Start: 2022-12-05 | End: 2022-12-06 | Stop reason: HOSPADM

## 2022-12-05 RX ORDER — SODIUM CHLORIDE 0.9 % (FLUSH) 0.9 %
10 SYRINGE (ML) INJECTION EVERY 12 HOURS SCHEDULED
Status: DISCONTINUED | OUTPATIENT
Start: 2022-12-05 | End: 2022-12-06 | Stop reason: HOSPADM

## 2022-12-05 RX ORDER — BISACODYL 10 MG
10 SUPPOSITORY, RECTAL RECTAL DAILY PRN
Status: DISCONTINUED | OUTPATIENT
Start: 2022-12-05 | End: 2022-12-06 | Stop reason: HOSPADM

## 2022-12-05 RX ORDER — SODIUM CHLORIDE 0.9 % (FLUSH) 0.9 %
10 SYRINGE (ML) INJECTION AS NEEDED
Status: DISCONTINUED | OUTPATIENT
Start: 2022-12-05 | End: 2022-12-06 | Stop reason: HOSPADM

## 2022-12-05 RX ORDER — ONDANSETRON 4 MG/1
4 TABLET, FILM COATED ORAL EVERY 6 HOURS PRN
Status: DISCONTINUED | OUTPATIENT
Start: 2022-12-05 | End: 2022-12-06 | Stop reason: HOSPADM

## 2022-12-05 RX ORDER — LORAZEPAM 2 MG/ML
0.5 INJECTION INTRAMUSCULAR ONCE
Status: COMPLETED | OUTPATIENT
Start: 2022-12-05 | End: 2022-12-05

## 2022-12-05 RX ORDER — ONDANSETRON 2 MG/ML
4 INJECTION INTRAMUSCULAR; INTRAVENOUS EVERY 6 HOURS PRN
Status: DISCONTINUED | OUTPATIENT
Start: 2022-12-05 | End: 2022-12-06 | Stop reason: HOSPADM

## 2022-12-05 RX ORDER — SODIUM CHLORIDE 9 MG/ML
40 INJECTION, SOLUTION INTRAVENOUS AS NEEDED
Status: DISCONTINUED | OUTPATIENT
Start: 2022-12-05 | End: 2022-12-06 | Stop reason: HOSPADM

## 2022-12-05 RX ORDER — ACETAMINOPHEN 325 MG/1
650 TABLET ORAL EVERY 4 HOURS PRN
Status: DISCONTINUED | OUTPATIENT
Start: 2022-12-05 | End: 2022-12-06 | Stop reason: HOSPADM

## 2022-12-05 RX ORDER — POLYETHYLENE GLYCOL 3350 17 G/17G
17 POWDER, FOR SOLUTION ORAL DAILY PRN
Status: DISCONTINUED | OUTPATIENT
Start: 2022-12-05 | End: 2022-12-06 | Stop reason: HOSPADM

## 2022-12-05 RX ORDER — AMOXICILLIN 250 MG
2 CAPSULE ORAL 2 TIMES DAILY
Status: DISCONTINUED | OUTPATIENT
Start: 2022-12-05 | End: 2022-12-06 | Stop reason: HOSPADM

## 2022-12-05 RX ORDER — LABETALOL HYDROCHLORIDE 5 MG/ML
20 INJECTION, SOLUTION INTRAVENOUS ONCE
Status: COMPLETED | OUTPATIENT
Start: 2022-12-05 | End: 2022-12-05

## 2022-12-05 RX ADMIN — CLONIDINE HYDROCHLORIDE 0.1 MG: 0.1 TABLET ORAL at 19:00

## 2022-12-05 RX ADMIN — ACETAMINOPHEN 650 MG: 325 TABLET, FILM COATED ORAL at 15:33

## 2022-12-05 RX ADMIN — LABETALOL HYDROCHLORIDE 20 MG: 5 INJECTION, SOLUTION INTRAVENOUS at 10:45

## 2022-12-05 RX ADMIN — CLONIDINE HYDROCHLORIDE 0.1 MG: 0.1 TABLET ORAL at 15:33

## 2022-12-05 RX ADMIN — LORAZEPAM 0.5 MG: 2 INJECTION INTRAMUSCULAR; INTRAVENOUS at 15:33

## 2022-12-05 NOTE — ED PROVIDER NOTES
"Subjective      Provider in Triage Note  Patient is a 52-year-old white female with history of fibromyalgia who presents today with complaints of not feeling well for the last couple of days.  She states she checked her blood pressure and noticed it was elevated.  She states today her blood pressure remains elevated and she continues to not feel well.  She also reports that today she has some discomfort in the left chest and feels somewhat short of breath.      History of Present Illness  52-year-old female presents with complaint of elevated blood pressure since seeing her OB/GYN on Thursday.  She reports that she has been monitoring it at home and it has remained elevated since.  She reports she also has associated fatigue, dyspnea, left chest pain under the breast described as \"sharp\" intermittent lasting approximately 3 seconds and nonradiating.  She denies unilateral leg pain or edema, recent travel, HRT, DVT PE history.  She reports that she previously was diagnosed with hypertension but does not take medication for this.  She does have a history of exercise-induced asthma and pneumonia 3 years prior which she reports has affected her lung capacity.  She is an occasional smoker.  She has no personal history of CAD but does report her father has had a four-vessel CABG.    1. Location: Left chest  2. Quality: Sharp  3. Severity: Moderate  4. Worsening factors: Denies  5. Alleviating factors: Denies  6. Onset: 4 days  7. Radiation: Denies  8. Frequency: Intermittent  9. Co-morbidities: Past Medical History:  No date: Fibromyalgia      History provided by:  Patient   used: No        Review of Systems   Constitutional: Negative for chills, diaphoresis and fever.   HENT: Negative for congestion, ear pain, rhinorrhea, sneezing, sore throat, trouble swallowing and voice change.    Respiratory: Positive for shortness of breath. Negative for cough, chest tightness, wheezing and stridor.  "   Cardiovascular: Positive for chest pain. Negative for palpitations and leg swelling.   Gastrointestinal: Negative for nausea and vomiting.   Genitourinary: Negative.    Musculoskeletal: Negative.    Skin: Negative for color change, pallor and rash.   Allergic/Immunologic: Negative for immunocompromised state.   Neurological: Negative for dizziness, weakness and headaches.   Psychiatric/Behavioral: Negative for confusion.   All other systems reviewed and are negative.      Past Medical History:   Diagnosis Date   • Fibromyalgia        Allergies   Allergen Reactions   • Codeine Hives       Past Surgical History:   Procedure Laterality Date   • ANKLE SURGERY Left 2009       No family history on file.    Social History     Socioeconomic History   • Marital status:    Tobacco Use   • Smoking status: Never   • Smokeless tobacco: Never           Objective   Physical Exam  Vitals and nursing note reviewed.   Constitutional:       General: She is awake. She is not in acute distress.     Appearance: Normal appearance. She is well-developed. She is obese. She is not ill-appearing or diaphoretic.   HENT:      Head: Normocephalic and atraumatic.   Eyes:      Extraocular Movements: Extraocular movements intact.      Conjunctiva/sclera: Conjunctivae normal.      Pupils: Pupils are equal, round, and reactive to light.   Neck:      Thyroid: No thyromegaly.      Vascular: No JVD.      Trachea: No tracheal deviation.   Cardiovascular:      Rate and Rhythm: Normal rate and regular rhythm.      Pulses: Normal pulses.           Radial pulses are 2+ on the right side and 2+ on the left side.        Dorsalis pedis pulses are 2+ on the right side and 2+ on the left side.        Posterior tibial pulses are 2+ on the right side and 2+ on the left side.      Heart sounds: Normal heart sounds, S1 normal and S2 normal. Heart sounds not distant. No murmur heard.    No friction rub. No gallop.   Pulmonary:      Effort: Pulmonary effort is  normal. No respiratory distress.      Breath sounds: Normal breath sounds and air entry. No wheezing or rales.   Chest:      Chest wall: No tenderness.   Abdominal:      General: Bowel sounds are normal. There is no distension.      Palpations: Abdomen is soft. There is no mass.      Tenderness: There is no abdominal tenderness. There is no guarding or rebound.      Hernia: No hernia is present.   Musculoskeletal:         General: No swelling. Normal range of motion.      Cervical back: Normal range of motion and neck supple.      Right lower leg: No edema.      Left lower leg: No edema.   Skin:     General: Skin is warm and dry.      Capillary Refill: Capillary refill takes less than 2 seconds.   Neurological:      Mental Status: She is alert and oriented to person, place, and time.      GCS: GCS eye subscore is 4. GCS verbal subscore is 5. GCS motor subscore is 6.   Psychiatric:         Attention and Perception: She is inattentive.         Mood and Affect: Mood is anxious.         Speech: Speech is tangential.         Behavior: Behavior normal. Behavior is cooperative.         Thought Content: Thought content normal.         Judgment: Judgment normal.         Procedures     Sinus rhythm no ectopy rate of 69.  Compared to previous EKG from 3/2/2020 sinus rhythm no ectopy rate of 81.  Interpreted by Dr. Kiser and reviewed by me.      ED Course    XR Chest 1 View    Result Date: 12/5/2022  No acute process.  Electronically Signed By-Marin Garvin MD On:12/5/2022 10:22 AM This report was finalized on [id number] by  Marin Garvin MD.    Medications   sodium chloride 0.9 % flush 10 mL (has no administration in time range)   labetalol (NORMODYNE,TRANDATE) injection 20 mg (20 mg Intravenous Given 12/5/22 1045)     Labs Reviewed   COMPREHENSIVE METABOLIC PANEL - Abnormal; Notable for the following components:       Result Value    Glucose 100 (*)     All other components within normal limits    Narrative:     GFR Normal  >60  Chronic Kidney Disease <60  Kidney Failure <15     PROTIME-INR - Normal   APTT - Normal   URINALYSIS W/ MICROSCOPIC IF INDICATED (NO CULTURE) - Normal    Narrative:     Urine microscopic not indicated.   TROPONIN (IN-HOUSE) - Normal    Narrative:     Troponin T Reference Range:  <= 0.03 ng/mL-   Negative for AMI  >0.03 ng/mL-     Abnormal for myocardial necrosis.  Clinicians would have to utilize clinical acumen, EKG, Troponin and serial changes to determine if it is an Acute Myocardial Infarction or myocardial injury due to an underlying chronic condition.       Results may be falsely decreased if patient taking Biotin.     BNP (IN-HOUSE) - Normal    Narrative:     Among patients with dyspnea, NT-proBNP is highly sensitive for the detection of acute congestive heart failure. In addition NT-proBNP of <300 pg/ml effectively rules out acute congestive heart failure with 99% negative predictive value.     TSH - Normal   MAGNESIUM - Normal   CBC WITH AUTO DIFFERENTIAL - Normal   D-DIMER, QUANTITATIVE   CBC AND DIFFERENTIAL    Narrative:     The following orders were created for panel order CBC & Differential.  Procedure                               Abnormality         Status                     ---------                               -----------         ------                     CBC Auto Differential[[id number]]        Normal              Final result                 Please view results for these tests on the individual orders.                       HEART Score: 3                      MDM  Number of Diagnoses or Management Options  Chest pain, unspecified type  Episodic tobacco abuse  Family history of premature CAD  Transient elevated blood pressure  Diagnosis management comments: Chart Review: 8/3/2022 patient was seen at urgent care for rash.  Comorbidity: Past Medical History:  No date: Fibromyalgia  Imaging: Was interpreted by physician and reviewed by myself: XR Chest 1 View   Final Result    No acute  process.          Electronically Signed By-Marin Garvin MD On:12/5/2022 10:22 AM    This report was finalized on [id number] by  Marin Garvin MD.    Patient undressed and placed in gown for exam.  Appropriate PPE worn during patient exam.  Patient is alert and oriented x3.  She is anxious on exam.  She is in no acute distress.  S1-S2 heart sounds on exam.  Lungs are clear to auscultation.  IV established and labs obtained per provider in triage.  Chest x-ray obtained with the above findings.  Patient was given [id number] mg IV and her blood pressure has improved.  Lab work-up was found to be unremarkable.  Chest x-ray shows no acute cardiopulmonary abnormalities. Work-up was found to be unremarkable.  Patient's blood pressure improved after labetalol.  She has a strong familial history of CAD, occasional tobacco use, hypertension.  She will be placed in ED observation unit for further cardiac work-up.  Spoke with Staci Langston NP who assumed care of patient.     Disposition/Treatment: Discussed results with patient, verbalized understanding. Agreeable with plan of care.  Patient was stable upon being placed in ED observation unit.       Part of this note may be an electronic transcription/translation of spoken language to printed text using the Dragon Dictation System.            Amount and/or Complexity of Data Reviewed  Clinical lab tests: reviewed  Tests in the radiology section of CPT®: reviewed  Tests in the medicine section of CPT®: reviewed    Risk of Complications, Morbidity, and/or Mortality  General comments: This case was discussed with my attending, and admitting provider Dr. Kiser is agreeable with plan of care    Patient Progress  Patient progress: stable      Final diagnoses:   Chest pain, unspecified type   Transient elevated blood pressure   Family history of premature CAD   Episodic tobacco abuse       ED Disposition  ED Disposition     ED Disposition   Decision to Admit    Condition   --     Comment   --             No follow-up provider specified.       Medication List      No changes were made to your prescriptions during this visit.          Judith Xavier, APRN  12/05/22 1300

## 2022-12-06 LAB — QT INTERVAL: 389 MS

## 2022-12-06 NOTE — ED NOTES
Pt has reported to staff including charge nurse frustration at wait times and not knowing what is going on. Pt's room has now been ready for a couple hours but pt is waiting to talk to dr. Sotomayor before deciding to stay or leave.